# Patient Record
Sex: MALE | Race: WHITE | NOT HISPANIC OR LATINO | URBAN - METROPOLITAN AREA
[De-identification: names, ages, dates, MRNs, and addresses within clinical notes are randomized per-mention and may not be internally consistent; named-entity substitution may affect disease eponyms.]

---

## 2023-10-10 ENCOUNTER — APPOINTMENT (RX ONLY)
Dept: URBAN - METROPOLITAN AREA CLINIC 70 | Facility: CLINIC | Age: 58
Setting detail: DERMATOLOGY
End: 2023-10-10

## 2023-10-10 DIAGNOSIS — D22 MELANOCYTIC NEVI: ICD-10-CM | Status: WELL CONTROLLED

## 2023-10-10 DIAGNOSIS — L81.4 OTHER MELANIN HYPERPIGMENTATION: ICD-10-CM

## 2023-10-10 DIAGNOSIS — D485 NEOPLASM OF UNCERTAIN BEHAVIOR OF SKIN: ICD-10-CM

## 2023-10-10 DIAGNOSIS — D18.0 HEMANGIOMA: ICD-10-CM

## 2023-10-10 DIAGNOSIS — L82.1 OTHER SEBORRHEIC KERATOSIS: ICD-10-CM

## 2023-10-10 DIAGNOSIS — L57.8 OTHER SKIN CHANGES DUE TO CHRONIC EXPOSURE TO NONIONIZING RADIATION: ICD-10-CM

## 2023-10-10 DIAGNOSIS — L57.0 ACTINIC KERATOSIS: ICD-10-CM

## 2023-10-10 DIAGNOSIS — Z71.89 OTHER SPECIFIED COUNSELING: ICD-10-CM

## 2023-10-10 PROBLEM — D48.5 NEOPLASM OF UNCERTAIN BEHAVIOR OF SKIN: Status: ACTIVE | Noted: 2023-10-10

## 2023-10-10 PROBLEM — D22.5 MELANOCYTIC NEVI OF TRUNK: Status: ACTIVE | Noted: 2023-10-10

## 2023-10-10 PROBLEM — D18.01 HEMANGIOMA OF SKIN AND SUBCUTANEOUS TISSUE: Status: ACTIVE | Noted: 2023-10-10

## 2023-10-10 PROCEDURE — 17003 DESTRUCT PREMALG LES 2-14: CPT

## 2023-10-10 PROCEDURE — ? SUNSCREEN TREATMENT REGIMEN

## 2023-10-10 PROCEDURE — ? TREATMENT REGIMEN

## 2023-10-10 PROCEDURE — 99203 OFFICE O/P NEW LOW 30 MIN: CPT | Mod: 25

## 2023-10-10 PROCEDURE — ? FULL BODY SKIN EXAM

## 2023-10-10 PROCEDURE — ? LIQUID NITROGEN

## 2023-10-10 PROCEDURE — 11102 TANGNTL BX SKIN SINGLE LES: CPT

## 2023-10-10 PROCEDURE — ? SUNSCREEN RECOMMENDATIONS

## 2023-10-10 PROCEDURE — ? COUNSELING

## 2023-10-10 PROCEDURE — ? BIOPSY BY SHAVE METHOD

## 2023-10-10 PROCEDURE — 17000 DESTRUCT PREMALG LESION: CPT | Mod: 59

## 2023-10-10 ASSESSMENT — LOCATION DETAILED DESCRIPTION DERM
LOCATION DETAILED: RIGHT NASAL ALA
LOCATION DETAILED: LEFT PROXIMAL DORSAL FOREARM
LOCATION DETAILED: RIGHT SUPERIOR UPPER BACK
LOCATION DETAILED: LEFT NASAL ALA
LOCATION DETAILED: EPIGASTRIC SKIN
LOCATION DETAILED: RIGHT INFERIOR CENTRAL MALAR CHEEK
LOCATION DETAILED: RIGHT MID-UPPER BACK
LOCATION DETAILED: RIGHT MEDIAL MALAR CHEEK
LOCATION DETAILED: LEFT SUPERIOR UPPER BACK
LOCATION DETAILED: RIGHT INFERIOR MEDIAL UPPER BACK

## 2023-10-10 ASSESSMENT — LOCATION ZONE DERM
LOCATION ZONE: FACE
LOCATION ZONE: TRUNK
LOCATION ZONE: NOSE
LOCATION ZONE: ARM

## 2023-10-10 ASSESSMENT — LOCATION SIMPLE DESCRIPTION DERM
LOCATION SIMPLE: LEFT NOSE
LOCATION SIMPLE: LEFT UPPER BACK
LOCATION SIMPLE: RIGHT NOSE
LOCATION SIMPLE: RIGHT UPPER BACK
LOCATION SIMPLE: ABDOMEN
LOCATION SIMPLE: LEFT FOREARM
LOCATION SIMPLE: RIGHT CHEEK

## 2023-10-10 NOTE — HPI: EVALUATION OF SKIN LESION(S)
What Type Of Note Output Would You Prefer (Optional)?: Standard Output
Hpi Title: Evaluation of Skin Lesions
How Severe Are Your Spot(S)?: mild
Have Your Spot(S) Been Treated In The Past?: has not been treated
Additional History: Patient has concerns about lesions on nose and forehead.

## 2023-10-10 NOTE — PROCEDURE: FULL BODY SKIN EXAM
Price (Do Not Change): 0.00
Detail Level: Generalized
Instructions: This plan will send the code FBSE to the PM system.  DO NOT or CHANGE the price.
Body Of Note (Please Add Your Own Text Here): Will set it up

## 2023-10-10 NOTE — PROCEDURE: LIQUID NITROGEN
Duration Of Freeze Thaw-Cycle (Seconds): 2
Render Post-Care Instructions In Note?: no
Show Aperture Variable?: Yes
Consent: The patient's consent was obtained including but not limited to risks of crusting, scabbing, blistering, scarring, darker or lighter pigmentary change, recurrence, incomplete removal and infection.
Detail Level: Detailed
Application Tool (Optional): Liquid Nitrogen Sprayer
Post-Care Instructions: I reviewed with the patient in detail post-care instructions. Patient is to wear sunprotection, and avoid picking at any of the treated lesions. Pt may apply Vaseline to crusted or scabbing areas.
Number Of Freeze-Thaw Cycles: 3 freeze-thaw cycles

## 2023-11-21 ENCOUNTER — APPOINTMENT (RX ONLY)
Dept: URBAN - METROPOLITAN AREA CLINIC 70 | Facility: CLINIC | Age: 58
Setting detail: DERMATOLOGY
End: 2023-11-21

## 2023-11-21 DIAGNOSIS — L57.0 ACTINIC KERATOSIS: ICD-10-CM

## 2023-11-21 DIAGNOSIS — L90.5 SCAR CONDITIONS AND FIBROSIS OF SKIN: ICD-10-CM

## 2023-11-21 DIAGNOSIS — L81.4 OTHER MELANIN HYPERPIGMENTATION: ICD-10-CM

## 2023-11-21 PROCEDURE — 17000 DESTRUCT PREMALG LESION: CPT

## 2023-11-21 PROCEDURE — ? COUNSELING

## 2023-11-21 PROCEDURE — ? ADDITIONAL NOTES

## 2023-11-21 PROCEDURE — ? SUNSCREEN RECOMMENDATIONS

## 2023-11-21 PROCEDURE — ? LIQUID NITROGEN

## 2023-11-21 PROCEDURE — 99213 OFFICE O/P EST LOW 20 MIN: CPT | Mod: 25

## 2023-11-21 ASSESSMENT — LOCATION SIMPLE DESCRIPTION DERM
LOCATION SIMPLE: RIGHT NOSE
LOCATION SIMPLE: LEFT CHEEK

## 2023-11-21 ASSESSMENT — LOCATION DETAILED DESCRIPTION DERM
LOCATION DETAILED: RIGHT NASAL ALA
LOCATION DETAILED: RIGHT NASAL ALAR GROOVE
LOCATION DETAILED: LEFT CENTRAL MALAR CHEEK

## 2023-11-21 ASSESSMENT — LOCATION ZONE DERM
LOCATION ZONE: NOSE
LOCATION ZONE: FACE

## 2024-09-04 NOTE — PROCEDURE: LIQUID NITROGEN
----- Message from Luis Jackson MD sent at 8/31/2024 12:16 PM CDT -----  Regarding: Follow up  Hi,    This pt was seen as an inpatient for fungemia rule out. Can we please have them scheduled in retina clinic in the next 2-3 months.    Thank you!  
Show Applicator Variable?: Yes
Detail Level: Detailed
Render Note In Bullet Format When Appropriate: No
Duration Of Freeze Thaw-Cycle (Seconds): 2
Number Of Freeze-Thaw Cycles: 3 freeze-thaw cycles
Application Tool (Optional): Liquid Nitrogen Sprayer
Consent: The patient's consent was obtained including but not limited to risks of crusting, scabbing, blistering, scarring, darker or lighter pigmentary change, recurrence, incomplete removal and infection.
Post-Care Instructions: I reviewed with the patient in detail post-care instructions. Patient is to wear sunprotection, and avoid picking at any of the treated lesions. Pt may apply Vaseline to crusted or scabbing areas.